# Patient Record
Sex: MALE | Race: WHITE | URBAN - METROPOLITAN AREA
[De-identification: names, ages, dates, MRNs, and addresses within clinical notes are randomized per-mention and may not be internally consistent; named-entity substitution may affect disease eponyms.]

---

## 2022-01-10 ENCOUNTER — HOSPITAL ENCOUNTER (OUTPATIENT)
Dept: LAB | Age: 62
Discharge: HOME | End: 2022-01-10
Payer: COMMERCIAL

## 2022-01-10 ENCOUNTER — OFFICE VISIT (OUTPATIENT)
Dept: INTERNAL MEDICINE CLINIC | Age: 62
End: 2022-01-10
Payer: COMMERCIAL

## 2022-01-10 VITALS
WEIGHT: 199 LBS | OXYGEN SATURATION: 97 % | HEIGHT: 74 IN | DIASTOLIC BLOOD PRESSURE: 86 MMHG | HEART RATE: 68 BPM | TEMPERATURE: 97.1 F | RESPIRATION RATE: 18 BRPM | BODY MASS INDEX: 25.54 KG/M2 | SYSTOLIC BLOOD PRESSURE: 122 MMHG

## 2022-01-10 DIAGNOSIS — Z12.11 COLON CANCER SCREENING: ICD-10-CM

## 2022-01-10 DIAGNOSIS — Z00.00 PHYSICAL EXAM, ROUTINE: ICD-10-CM

## 2022-01-10 DIAGNOSIS — N39.41 URGE INCONTINENCE OF URINE: ICD-10-CM

## 2022-01-10 DIAGNOSIS — Z76.89 ENCOUNTER TO ESTABLISH CARE: Primary | ICD-10-CM

## 2022-01-10 DIAGNOSIS — Z83.3 FAMILY HISTORY OF DIABETES MELLITUS IN FATHER: ICD-10-CM

## 2022-01-10 LAB
ALBUMIN SERPL-MCNC: 3.7 G/DL (ref 3.4–5)
ALBUMIN/GLOB SERPL: 0.9 {RATIO} (ref 0.8–1.7)
ALP SERPL-CCNC: 77 U/L (ref 45–117)
ALT SERPL-CCNC: 69 U/L (ref 16–61)
ANION GAP SERPL CALC-SCNC: 4 MMOL/L (ref 3–18)
AST SERPL-CCNC: 32 U/L (ref 10–38)
BILIRUB SERPL-MCNC: 0.6 MG/DL (ref 0.2–1)
BUN SERPL-MCNC: 13 MG/DL (ref 7–18)
BUN/CREAT SERPL: 14 (ref 12–20)
CALCIUM SERPL-MCNC: 8.9 MG/DL (ref 8.5–10.1)
CHLORIDE SERPL-SCNC: 106 MMOL/L (ref 100–111)
CHOLEST SERPL-MCNC: 156 MG/DL
CO2 SERPL-SCNC: 28 MMOL/L (ref 21–32)
CREAT SERPL-MCNC: 0.93 MG/DL (ref 0.6–1.3)
EST. AVERAGE GLUCOSE BLD GHB EST-MCNC: 114 MG/DL
GLOBULIN SER CALC-MCNC: 3.9 G/DL (ref 2–4)
GLUCOSE SERPL-MCNC: 81 MG/DL (ref 74–99)
HBA1C MFR BLD: 5.6 % (ref 4.2–5.6)
HDLC SERPL-MCNC: 37 MG/DL (ref 40–60)
HDLC SERPL: 4.2 {RATIO} (ref 0–5)
LDLC SERPL CALC-MCNC: 102.8 MG/DL (ref 0–100)
LIPID PROFILE,FLP: ABNORMAL
POTASSIUM SERPL-SCNC: 4.3 MMOL/L (ref 3.5–5.5)
PROT SERPL-MCNC: 7.6 G/DL (ref 6.4–8.2)
PSA SERPL-MCNC: 0.7 NG/ML (ref 0–4)
SODIUM SERPL-SCNC: 138 MMOL/L (ref 136–145)
TRIGL SERPL-MCNC: 81 MG/DL (ref ?–150)
VLDLC SERPL CALC-MCNC: 16.2 MG/DL

## 2022-01-10 PROCEDURE — 83036 HEMOGLOBIN GLYCOSYLATED A1C: CPT

## 2022-01-10 PROCEDURE — 99203 OFFICE O/P NEW LOW 30 MIN: CPT | Performed by: NURSE PRACTITIONER

## 2022-01-10 PROCEDURE — 84153 ASSAY OF PSA TOTAL: CPT

## 2022-01-10 PROCEDURE — 80053 COMPREHEN METABOLIC PANEL: CPT

## 2022-01-10 PROCEDURE — 80061 LIPID PANEL: CPT

## 2022-01-10 RX ORDER — MULTIVIT-MIN/FERROUS SULFATE 4.5 MG
POWDER IN PACKET (EA) ORAL
COMMUNITY

## 2022-01-10 RX ORDER — PUMPKIN SEED EXTRACT/SOY GERM 300 MG
CAPSULE ORAL
COMMUNITY

## 2022-01-10 NOTE — PROGRESS NOTES
Chief Complaint   Patient presents with   [facility] Establish Care     1. \"Have you been to the ER, urgent care clinic since your last visit? Hospitalized since your last visit? \" n/a    2. \"Have you seen or consulted any other health care providers outside of the [address] [place] [address] since your last visit? \" n/a     3. For patients aged 39-70: Has the patient had a colonoscopy / FIT/ Cologuard? No     If the patient is female:    4. For patients aged 41-77: Has the patient had a mammogram within the past 2 years? NA based on age or sex    11. For patients aged 21-65: Has the patient had a pap smear?  NA based on age or sex

## 2022-01-10 NOTE — PROGRESS NOTES
[facility] [place] [address] [address] [facility], [address]  [phone] [facility]/[phone] fax    1/10/2022    HPI:   Cami August 1960 is a pleasant WHITE male who presents today to establish care and for routine physical exam. He moved to [place] from [place] (2018). He has not establish care with a new PCP. He finds himself very healthy and not needing medical attention. He is a .  He is single with 3 adult children. He does not like to take chronic medications. Todays concerns:  1. Approximately 2019 he started to notice urinary incontinence. He started muscle exercises and over-the-counter prostate medications, this improved his symptoms. Within the last 6 months he has noticed urinary incontinence reoccurring. He gets up 1x/n to void. He is not bothered by his symptoms and is not seeking treatment nor referral to urology. 2. Declines influenza vaccine. No past medical history on file. No past surgical history on file. Current Outpatient Medications   Medication Sig    multivit-min-ferrous sulfate (One Daily Multi-Vit w-Mineral) 4.5 mg iron pwpk Take  by mouth.  potassium/mag/zinc/erick/chrom (POTASSIUM-CHROM-MAG-MAN-ZINC PO) Take  by mouth.  pumpkin seed extract-soy germ (Azo Bladder ControL) 300 mg cap Take  by mouth. No current facility-administered medications for this visit. Allergies and Intolerances:   Not on File  Family History:   Family History   Problem Relation Age of Onset    Diabetes Father      Social History:   He  reports that he has never smoked. He has never used smokeless tobacco.   Social History     Substance and Sexual Activity   Alcohol Use None     Immunization History: There is no immunization history on file for this patient. Review of Systems:   As above included in HPI.   Otherwise 11 point review of systems negative including constitutional, skin, HENT, eyes, respiratory, cardiovascular, gastrointestinal, genitourinary, musculoskeletal, endocrine, hematologic, allergy, and neurologic. Physical:   Visit Vitals  /86   Pulse 68   Temp 97.1 °F (36.2 °C) (Temporal)   Resp 18   Ht 6' 1.5\" (1.867 m)   Wt 199 lb (90.3 kg)   SpO2 97%   BMI 25.90 kg/m²      Wt Readings from Last 3 Encounters:   01/10/22 199 lb (90.3 kg)         Exam:   Physical Exam  Vitals and nursing note reviewed. Constitutional:       Appearance: Normal appearance. HENT:      Head: Normocephalic and atraumatic. Right Ear: Tympanic membrane, ear canal and external ear normal.      Left Ear: Tympanic membrane, ear canal and external ear normal.   Eyes:      Extraocular Movements: Extraocular movements intact. Conjunctiva/sclera: Conjunctivae normal.   Neck:      Vascular: No carotid bruit. Cardiovascular:      Rate and Rhythm: Normal rate and regular rhythm. Pulses: Normal pulses. Heart sounds: Normal heart sounds. Comments: No edema noted  Pulmonary:      Effort: Pulmonary effort is normal. No respiratory distress. Breath sounds: Normal breath sounds. No wheezing. Abdominal:      General: Abdomen is flat. Bowel sounds are normal. There is no distension. Palpations: Abdomen is soft. Tenderness: There is no abdominal tenderness. Musculoskeletal:         General: Normal range of motion. Cervical back: Normal range of motion and neck supple. 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 Neurological:      General: No focal deficit present. Mental Status: He is alert and oriented to person, place, and time. Psychiatric:         Mood and Affect: Mood normal.         Behavior: Behavior normal.       Review of Data:  Labs reviewed: N/A  Will obtain today      Plan:    ICD-10-CM ICD-9-CM    1. Encounter to establish care  Z76.89 V65.8    2. Urge incontinence of urine  N39.41 788.31    3.  Family history of diabetes mellitus in father  Z83.3 V18.0 HEMOGLOBIN A1C WITH EAG   4. Colon cancer screening  Z12.11 V76.51 REFERRAL TO GASTROENTEROLOGY   5. Physical exam, routine  Z00.00 V70.0 LIPID PANEL      METABOLIC PANEL, COMPREHENSIVE      PSA, DIAGNOSTIC (PROSTATE SPECIFIC AG)     Encounter to establish care  Patient is not seen a PCP in many years. What medical records he does have are in [place]. Urge incontinence of urine  Continue exercises to strengthen muscle  Continue over-the-counter prostate treatment  Will refer to urology if symptoms become bothersome    Family history of diabetes mellitus in father  A1c today    Colon cancer screening  Referral placed for gastroenterology    Physical exam, routine  Completed      Follow up 6 months no labs       Dr. Deon Lovell, AGNP-C, DNP  [facility]     The total face time was 30 minutes. Greater than 50% of that time was spent in counseling and/or coordination of care. My summary of patient counseling and coordination of care includes Reviewing medical record, assessing patient, placing orders, and discussing plan of care with patient.

## 2022-01-12 ENCOUNTER — TELEPHONE (OUTPATIENT)
Dept: INTERNAL MEDICINE CLINIC | Age: 62
End: 2022-01-12

## 2022-01-12 NOTE — TELEPHONE ENCOUNTER
----- Message from Reza Tom DNP sent at 1/11/2022  7:56 PM EST -----  A1c 5.6-borderline diabetes  Kidneys/liver ok  PSA good 0.7  . Reduce fried fatty foods.   Thank you

## 2022-01-12 NOTE — PROGRESS NOTES
A1c 5.6-borderline diabetes  Kidneys/liver ok  PSA good 0.7  . Reduce fried fatty foods.   Thank you

## 2022-01-14 NOTE — TELEPHONE ENCOUNTER
Pt returned call and I gave him Dr Aislinn Sellers message. He voiced understanding. Kenyatta Leonard he will work on cutting out fried foods and cut down on sugary drinks.

## 2022-06-17 ENCOUNTER — TELEPHONE (OUTPATIENT)
Dept: INTERNAL MEDICINE CLINIC | Age: 62
End: 2022-06-17

## 2022-06-17 NOTE — TELEPHONE ENCOUNTER
Called pt to confirm appt. Says he is no longer a patient here. Moved to [facility]. Chart closed.